# Patient Record
Sex: FEMALE | Race: WHITE | NOT HISPANIC OR LATINO
[De-identification: names, ages, dates, MRNs, and addresses within clinical notes are randomized per-mention and may not be internally consistent; named-entity substitution may affect disease eponyms.]

---

## 2017-11-22 PROBLEM — Z00.00 ENCOUNTER FOR PREVENTIVE HEALTH EXAMINATION: Status: ACTIVE | Noted: 2017-11-22

## 2017-12-15 ENCOUNTER — APPOINTMENT (OUTPATIENT)
Dept: ORTHOPEDIC SURGERY | Facility: CLINIC | Age: 67
End: 2017-12-15
Payer: MEDICARE

## 2017-12-15 VITALS — BODY MASS INDEX: 19.49 KG/M2 | WEIGHT: 110 LBS | HEIGHT: 63 IN

## 2017-12-15 DIAGNOSIS — Z82.49 FAMILY HISTORY OF ISCHEMIC HEART DISEASE AND OTHER DISEASES OF THE CIRCULATORY SYSTEM: ICD-10-CM

## 2017-12-15 DIAGNOSIS — Z78.9 OTHER SPECIFIED HEALTH STATUS: ICD-10-CM

## 2017-12-15 PROCEDURE — 73030 X-RAY EXAM OF SHOULDER: CPT | Mod: RT

## 2017-12-15 PROCEDURE — 99203 OFFICE O/P NEW LOW 30 MIN: CPT

## 2017-12-15 RX ORDER — MULTIVIT-MIN/IRON/FOLIC ACID/K 18-600-40
CAPSULE ORAL
Refills: 0 | Status: ACTIVE | COMMUNITY

## 2017-12-15 RX ORDER — CALCIUM CARBONATE/VITAMIN D3 600 MG-10
TABLET ORAL
Refills: 0 | Status: ACTIVE | COMMUNITY

## 2017-12-19 ENCOUNTER — APPOINTMENT (OUTPATIENT)
Dept: ORTHOPEDIC SURGERY | Facility: CLINIC | Age: 67
End: 2017-12-19
Payer: MEDICARE

## 2017-12-19 VITALS — HEIGHT: 63 IN | BODY MASS INDEX: 18.78 KG/M2 | WEIGHT: 106 LBS | RESPIRATION RATE: 16 BRPM

## 2017-12-19 DIAGNOSIS — Z87.39 PERSONAL HISTORY OF OTHER DISEASES OF THE MUSCULOSKELETAL SYSTEM AND CONNECTIVE TISSUE: ICD-10-CM

## 2017-12-19 DIAGNOSIS — S63.437A: ICD-10-CM

## 2017-12-19 PROCEDURE — 99203 OFFICE O/P NEW LOW 30 MIN: CPT

## 2018-05-16 ENCOUNTER — APPOINTMENT (OUTPATIENT)
Dept: ORTHOPEDIC SURGERY | Facility: CLINIC | Age: 68
End: 2018-05-16
Payer: MEDICARE

## 2018-05-16 DIAGNOSIS — M75.81 OTHER SHOULDER LESIONS, RIGHT SHOULDER: ICD-10-CM

## 2018-05-16 DIAGNOSIS — M47.812 SPONDYLOSIS W/OUT MYELOPATHY OR RADICULOPATHY, CERVICAL REGION: ICD-10-CM

## 2018-05-16 PROCEDURE — 72050 X-RAY EXAM NECK SPINE 4/5VWS: CPT

## 2018-05-16 PROCEDURE — 99214 OFFICE O/P EST MOD 30 MIN: CPT

## 2018-07-23 PROBLEM — Z78.9 ALCOHOL USE: Status: ACTIVE | Noted: 2017-12-15

## 2020-12-11 ENCOUNTER — APPOINTMENT (OUTPATIENT)
Dept: ORTHOPEDIC SURGERY | Facility: CLINIC | Age: 70
End: 2020-12-11

## 2022-06-06 ENCOUNTER — APPOINTMENT (OUTPATIENT)
Dept: ORTHOPEDIC SURGERY | Facility: CLINIC | Age: 72
End: 2022-06-06
Payer: MEDICARE

## 2022-06-06 VITALS — WEIGHT: 94 LBS | BODY MASS INDEX: 16.66 KG/M2 | HEIGHT: 63 IN

## 2022-06-06 DIAGNOSIS — M16.11 UNILATERAL PRIMARY OSTEOARTHRITIS, RIGHT HIP: ICD-10-CM

## 2022-06-06 DIAGNOSIS — M77.11 LATERAL EPICONDYLITIS, RIGHT ELBOW: ICD-10-CM

## 2022-06-06 DIAGNOSIS — M47.816 SPONDYLOSIS W/OUT MYELOPATHY OR RADICULOPATHY, LUMBAR REGION: ICD-10-CM

## 2022-06-06 PROCEDURE — 72100 X-RAY EXAM L-S SPINE 2/3 VWS: CPT

## 2022-06-06 PROCEDURE — 73502 X-RAY EXAM HIP UNI 2-3 VIEWS: CPT | Mod: RT

## 2022-06-06 PROCEDURE — 99204 OFFICE O/P NEW MOD 45 MIN: CPT

## 2022-06-06 RX ORDER — OLOPATADINE HYDROCHLORIDE 7 MG/ML
0.7 SOLUTION OPHTHALMIC
Qty: 1 | Refills: 0 | Status: COMPLETED | COMMUNITY
Start: 2017-11-20 | End: 2022-06-06

## 2022-06-06 RX ORDER — AZITHROMYCIN 250 MG/1
250 TABLET, FILM COATED ORAL
Qty: 6 | Refills: 0 | Status: COMPLETED | COMMUNITY
Start: 2022-02-01

## 2022-06-06 RX ORDER — ERYTHROMYCIN 5 MG/G
5 OINTMENT OPHTHALMIC
Qty: 4 | Refills: 0 | Status: COMPLETED | COMMUNITY
Start: 2022-04-21

## 2022-06-06 RX ORDER — VIT A AND D3 IN COD LIVER OIL 1250-135
400 CAPSULE ORAL
Refills: 0 | Status: COMPLETED | COMMUNITY
End: 2022-06-06

## 2022-06-06 RX ORDER — CONJUGATED ESTROGENS/BAZEDOXIFENE .45; 2 MG/1; MG/1
0.45-2 TABLET, FILM COATED ORAL
Qty: 30 | Refills: 0 | Status: COMPLETED | COMMUNITY
Start: 2017-09-21 | End: 2022-06-06

## 2022-06-06 RX ORDER — CLINDAMYCIN PHOSPHATE 1 G/10ML
1 GEL TOPICAL
Qty: 60 | Refills: 0 | Status: COMPLETED | COMMUNITY
Start: 2022-02-02

## 2022-06-06 RX ORDER — ESTRADIOL 0.1 MG/G
0.1 CREAM VAGINAL
Qty: 42 | Refills: 0 | Status: COMPLETED | COMMUNITY
Start: 2021-12-10

## 2022-06-06 RX ORDER — DIAZEPAM 2 MG/1
2 TABLET ORAL
Qty: 30 | Refills: 0 | Status: COMPLETED | COMMUNITY
Start: 2021-12-27

## 2022-06-06 RX ORDER — DIAZEPAM 5 MG/1
5 TABLET ORAL
Qty: 30 | Refills: 0 | Status: COMPLETED | COMMUNITY
Start: 2021-12-27

## 2022-06-06 NOTE — PHYSICAL EXAM
[de-identified] : Spine:\par Range of motion of the cervical spine is full with pain on extension to the left side.\par Tenderness in the left cervical spine and trapezius. No palpable abnormalities.\par Motor and sensation are intact in the upper extremities.\par Deep tendon reflexes are intact.\par Full range of motion bilateral shoulders without limitation or pain except mildly in the left axilla. No lymphadenopathy palpated. Good strength of the rotator cuff with 5/5 supraspinatus, internal and external rotation and biceps and triceps and . Sensation is intact. [de-identified] : \par X-rays of the lumbar spine AP and lateral views today show mild degenerative disc disease L4-L5 and facet arthrosis.\par AP and lateral x-rays of the right hip show joint space narrowing globally and osteophytes consistent with moderate to severe osteoarthritis.  From the small amount to see her left hip there is likely mild arthritis on the left as well.

## 2022-06-06 NOTE — HISTORY OF PRESENT ILLNESS
[de-identified] : 72 yo comes in for pain in her RIGHT hip and low back. The pain is chronic and has been on and off for about 7 years. She describes pain as intermittent and dull. She rates the pain as a 5-6/10. She has biofreeze that she has tried on her hip. She is not taking pain medications. She cant do yoga anymore bc the gym closed it down but she felt it was good for her. She goes to the gym every other day and does cardio but she afterwards she says she cramps up and has pain. \par She has done physical therapy about 7 years ago for her back and RIGHT hip. \par She is also having RIGHT elbow pain for a couple weeks with no specific incident. \par

## 2022-06-06 NOTE — ASSESSMENT
[FreeTextEntry1] : 71-year-old woman comes in with 3 different issues.  She has right elbow pain most recently over the last several weeks which is most consistent with lateral epicondylitis.  She has more chronic pain around the right hip and sometimes low back pain.  There is some mild degenerative disc disease and spondylosis in the lumbar spine.  She has no radicular symptoms or neurologic deficits.\par She also has pain in the right groin and hip area  and x-rays that showed moderate  to severe osteoarthritis of the right hip joint and it appears as though there is mild left hip arthritis.\par \par For the elbow I suggested some exercises to do and use heat and ice.  The pain there can take a while to get better but usually will resolve.  She can do some physical therapy.  She has done therapy in the past for her back which was not as helpful.  She can do some home exercises on her own and stretching and core strengthening.  Arthritis tends to be less helpful with hip arthritis.  She does not take medication but could take occasional ibuprofen or Tylenol as needed.  She uses topical ointments.\par She can follow-up if the elbow is not getting better although it can take some time to improve off in 6 months to a year.